# Patient Record
Sex: FEMALE | Race: BLACK OR AFRICAN AMERICAN | NOT HISPANIC OR LATINO | Employment: OTHER | URBAN - METROPOLITAN AREA
[De-identification: names, ages, dates, MRNs, and addresses within clinical notes are randomized per-mention and may not be internally consistent; named-entity substitution may affect disease eponyms.]

---
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
## 2020-02-27 ENCOUNTER — TELEPHONE (OUTPATIENT)
Dept: PEDIATRIC HEMATOLOGY/ONCOLOGY | Facility: CLINIC | Age: 20
End: 2020-02-27

## 2020-02-27 NOTE — TELEPHONE ENCOUNTER
Pt overdue to see Dr Gifford, was due to be seen 10/2019. Called to schedule pt, no answer at mom's # [phone], rang once and went to voicemail, left message for mom to call back to [phone] to schedule. Called # on file for dad, [phone], no answer and no voicemail available to leave a message. Letter sent home for parent to call to schedule.

## 2020-02-27 NOTE — LETTER
February 27, 2020    Daniela Lagos  [address] [address] [facility] [address]             [facility]  [address]  [place] [address]  Phone: [phone]  Fax: [phone] Dear parent of Daniela Lagos:    Daniela was due to follow up with Dr Gifford in October. Please give us a call at [phone] to schedule that appointment at your convenience.       Sincerely,        Yola Shepherd RN

## 2021-03-19 ENCOUNTER — TELEPHONE (OUTPATIENT)
Dept: PEDIATRIC HEMATOLOGY/ONCOLOGY | Facility: CLINIC | Age: 21
End: 2021-03-19

## 2021-03-22 DIAGNOSIS — D69.3 CHRONIC ITP (IDIOPATHIC THROMBOCYTOPENIA): Primary | ICD-10-CM

## 2023-01-13 ENCOUNTER — HOSPITAL ENCOUNTER (EMERGENCY)
Facility: HOSPITAL | Age: 23
Discharge: HOME | End: 2023-01-14
Attending: EMERGENCY MEDICINE
Payer: MEDICAID

## 2023-01-13 DIAGNOSIS — H18.602 KERATOCONUS OF LEFT EYE: Primary | ICD-10-CM

## 2023-01-13 DIAGNOSIS — R62.50 DEVELOPMENTAL DELAY: ICD-10-CM

## 2023-01-13 DIAGNOSIS — I10 HYPERTENSION, UNSPECIFIED TYPE: ICD-10-CM

## 2023-01-13 PROCEDURE — 99284 EMERGENCY DEPT VISIT MOD MDM: CPT | Mod: 25

## 2023-01-13 PROCEDURE — 25000003 PHARM REV CODE 250: Performed by: EMERGENCY MEDICINE

## 2023-01-13 PROCEDURE — 99284 EMERGENCY DEPT VISIT MOD MDM: CPT | Mod: ,,, | Performed by: EMERGENCY MEDICINE

## 2023-01-13 PROCEDURE — 99284 PR EMERGENCY DEPT VISIT,LEVEL IV: ICD-10-PCS | Mod: ,,, | Performed by: EMERGENCY MEDICINE

## 2023-01-13 RX ORDER — PROPARACAINE HYDROCHLORIDE 5 MG/ML
1 SOLUTION/ DROPS OPHTHALMIC
Status: COMPLETED | OUTPATIENT
Start: 2023-01-13 | End: 2023-01-13

## 2023-01-13 RX ORDER — DROPERIDOL 2.5 MG/ML
5 INJECTION, SOLUTION INTRAMUSCULAR; INTRAVENOUS
Status: COMPLETED | OUTPATIENT
Start: 2023-01-14 | End: 2023-01-14

## 2023-01-13 RX ADMIN — PROPARACAINE HYDROCHLORIDE 1 DROP: 5 SOLUTION/ DROPS OPHTHALMIC at 10:01

## 2023-01-13 RX ADMIN — FLUORESCEIN SODIUM 1 EACH: 1 STRIP OPHTHALMIC at 10:01

## 2023-01-14 ENCOUNTER — TELEPHONE (OUTPATIENT)
Dept: EMERGENCY MEDICINE | Facility: HOSPITAL | Age: 23
End: 2023-01-14
Payer: MEDICAID

## 2023-01-14 VITALS
HEIGHT: 56 IN | RESPIRATION RATE: 17 BRPM | HEART RATE: 99 BPM | TEMPERATURE: 98 F | BODY MASS INDEX: 18.44 KG/M2 | OXYGEN SATURATION: 98 % | SYSTOLIC BLOOD PRESSURE: 147 MMHG | WEIGHT: 82 LBS | DIASTOLIC BLOOD PRESSURE: 90 MMHG

## 2023-01-14 PROCEDURE — 63600175 PHARM REV CODE 636 W HCPCS: Performed by: EMERGENCY MEDICINE

## 2023-01-14 PROCEDURE — S0166 INJ OLANZAPINE 2.5MG: HCPCS | Performed by: EMERGENCY MEDICINE

## 2023-01-14 PROCEDURE — 25000003 PHARM REV CODE 250: Performed by: EMERGENCY MEDICINE

## 2023-01-14 PROCEDURE — 96372 THER/PROPH/DIAG INJ SC/IM: CPT | Performed by: EMERGENCY MEDICINE

## 2023-01-14 RX ORDER — MIDAZOLAM HYDROCHLORIDE 5 MG/ML
0.2 INJECTION INTRAMUSCULAR; INTRAVENOUS
Status: COMPLETED | OUTPATIENT
Start: 2023-01-14 | End: 2023-01-14

## 2023-01-14 RX ORDER — DROPERIDOL 2.5 MG/ML
5 INJECTION, SOLUTION INTRAMUSCULAR; INTRAVENOUS
Status: COMPLETED | OUTPATIENT
Start: 2023-01-14 | End: 2023-01-14

## 2023-01-14 RX ORDER — OLANZAPINE 10 MG/2ML
10 INJECTION, POWDER, FOR SOLUTION INTRAMUSCULAR ONCE AS NEEDED
Status: COMPLETED | OUTPATIENT
Start: 2023-01-14 | End: 2023-01-14

## 2023-01-14 RX ORDER — ATROPINE SULFATE 10 MG/ML
1 SOLUTION/ DROPS OPHTHALMIC 2 TIMES DAILY
Qty: 5 ML | Refills: 1 | Status: SHIPPED | OUTPATIENT
Start: 2023-01-14

## 2023-01-14 RX ORDER — SODIUM CHLORIDE 20 MG/ML
1 SOLUTION OPHTHALMIC 4 TIMES DAILY
Qty: 15 ML | Refills: 1 | Status: SHIPPED | OUTPATIENT
Start: 2023-01-14 | End: 2023-02-13

## 2023-01-14 RX ADMIN — DROPERIDOL 5 MG: 2.5 INJECTION, SOLUTION INTRAMUSCULAR; INTRAVENOUS at 12:01

## 2023-01-14 RX ADMIN — MIDAZOLAM HYDROCHLORIDE 3 MG: 5 INJECTION, SOLUTION INTRAMUSCULAR; INTRAVENOUS at 04:01

## 2023-01-14 RX ADMIN — MIDAZOLAM HYDROCHLORIDE 7.5 MG: 5 INJECTION, SOLUTION INTRAMUSCULAR; INTRAVENOUS at 03:01

## 2023-01-14 RX ADMIN — DROPERIDOL 5 MG: 2.5 INJECTION, SOLUTION INTRAMUSCULAR; INTRAVENOUS at 01:01

## 2023-01-14 RX ADMIN — OLANZAPINE 10 MG: 10 INJECTION, POWDER, LYOPHILIZED, FOR SOLUTION INTRAMUSCULAR at 04:01

## 2023-01-14 NOTE — ED NOTES
Pt remains arousable to gentle touch. Respirations E/UL. NAD noted. Pt remains attached to continuous pulse ox and cardiac monitor with mom at bedside. Mom updated on POC and understands optho is coming to help assess and examine pt's eye. Denies any needs at this time

## 2023-01-14 NOTE — ED PROVIDER NOTES
Source of History:  Mother  Chart    Chief complaint:  Eye Drainage (Left eye drainage and crusting for 4 days with swelling. Denies fever. Started erythromycin Wednesday but symptoms continuing.)      HPI:  Daniela Lagos is a 22 y.o. female with history of of mental delay, seizure, autoimmune hypothyroidism, presenting to emergency department with complaint of left eye redness, presumed pain, and drainage.      Patient's mother provide history, as patient is nonverbal at baseline.  Patient's mother states patient follows commands, but is stubborn.   Patient's mother states she 1st noticed redness and tearing from the left eye 2 days ago.  Patient was seen by primary care, prescribed erythromycin eye ointment which mom has been using.  Mom notes the patient has been frequently rubbing the left eye.  There is no notable ocular history in this patient.      No fevers.  She notes some swelling around the eye.  No altered mental status.  Mom also noted that patient's blood pressure was elevated today.    Review of patient's allergies indicates:   Allergen Reactions    Milk containing products Other (See Comments)     bleeding    Tree nut        No current facility-administered medications on file prior to encounter.     Current Outpatient Medications on File Prior to Encounter   Medication Sig Dispense Refill    amoxicillin (AMOXIL) 875 MG tablet Take 875 mg by mouth every 12 (twelve) hours.      clotrimazole (LOTRIMIN) 1 % cream Apply topically 2 (two) times daily. 12 g 1    clotrimazole-betamethasone 1-0.05% (LOTRISONE) cream THONG AA Q OTHER NIGHT AT BEDTIME  1    diazepam 5-7.5-10 mg (DIASTAT ACUDIAL) 5-7.5-10 mg Kit 10 mg in rectum to stop seizure 2 kit 5    ketoconazole (NIZORAL) 2 % shampoo WASH EXT TO THE SCALP QD  2    levothyroxine (SYNTHROID) 150 MCG tablet Take 1 tablet (150 mcg total) by mouth before breakfast. 30 tablet 4    OCEAN NASAL 0.65 % nasal spray SPRAY 1 SPRAY IEN QID FOR 5 DAYS  0       PMH:  As per  HPI and below:  Past Medical History:   Diagnosis Date    Blood transfusion     x13    Chronic ITP (idiopathic thrombocytopenia) 8/7/2015    Febrile seizure 2006    Mental retardation, moderate (I.Q. 35-49)     Premature baby      Past Surgical History:   Procedure Laterality Date    PEG TUBE REMOVAL      UPPER GASTROINTESTINAL ENDOSCOPY         Social History     Socioeconomic History    Marital status: Single   Tobacco Use    Smoking status: Never    Smokeless tobacco: Never   Substance and Sexual Activity    Alcohol use: No    Drug use: No    Sexual activity: Never       Family History   Problem Relation Age of Onset    Hypertension Maternal Grandmother     Thyroid disease Neg Hx     Diabetes Neg Hx        Physical Exam:      Vitals:    01/14/23 0557   BP: (!) 147/90   Pulse: 99   Resp: 17   Temp: 98 °F (36.7 °C)     Gen: Uncomfortable.  Dysmorphic facies, short neck.  Mental Status:  Alert.  Follows commands from her mother.  Nonverbal at baseline.  Skin: Warm, dry. No rashes seen.  Eyes:  Left eye has conjunctival injection, tearing.  She is holding it shut.  Briefly able to glimpse what looks like a corneal ulcer.  Pulm: No increased work of breathing.  No significant tachypnea.  No audible stridor or wheezing.  No conversational dyspnea.    CV: Regular rate. Regular rhythm.   MSK: Good range of motion all joints.  No deformities.    Neuro: Awake. No focal neuro deficit observed.    Laboratory Studies:  Labs Reviewed - No data to display    Chart reviewed.     Imaging Results    None         Medications Given:  Medications   proparacaine 0.5 % ophthalmic solution 1 drop (1 drop Left Eye Given 1/13/23 2214)   fluorescein ophthalmic strip 1 each (1 each Left Eye Given 1/13/23 2214)   droperidoL injection 5 mg (5 mg Intramuscular Given 1/14/23 0032)   droperidoL injection 5 mg (5 mg Intramuscular Given 1/14/23 0153)   midazolam (VERSED) 5 mg/mL injection 7.5 mg (7.5 mg Nasal Given 1/14/23 0306)   midazolam  (VERSED) 5 mg/mL injection 7.5 mg (3 mg Nasal Given 1/14/23 8102)   OLANZapine injection 10 mg (10 mg Intramuscular Given 1/14/23 6780)       Discussed with:  Ophthalmology    MDM:    22 y.o. female with history of developmental delay presenting to emergency department with complaint of painful left red eye.  Patient initially combative during my initial attempt at exam.  Will give dose of droperidol and reassess.  Mother states patient is resistant to anesthesia.     1:27 AM  Re-attempted exam after patient received 1st 5 mg of IM droperidol.  Patient is somnolent but arousable, becomes violent when I attempt to examine her.  Will give additional dose of droperidol.  Will also give intranasal Versed prior to my next exam attempt.    On next attempt, after a total of 10 mg of IM droperidol, and 7.5 mg of intranasal Versed, patient still combative, punching me multiple times while I am attempting to perform the exam.      I consulted Ophthalmology, my concern is for a vision threatening corneal ulcer on the brief glimpse is that I have pad of her I so far.      With Ophthalmology is assistance, and an additional 10 mg of IM olanzapine as well as more intranasal Versed, we were able to perform an exam.  Ophthalmology feels this is consistent with keratoconus of the left eye.  They are recommending the medications that have been prescribed to her.  No indication for Hospital Medicine admission at this time.    Diagnostic Impression:    1. Keratoconus of left eye    2. Hypertension, unspecified type    3. Developmental delay         ED Disposition Condition    Discharge Stable          ED Prescriptions       Medication Sig Dispense Start Date End Date Auth. Provider    atropine 1% (ISOPTO ATROPINE) 1 % Drop Place 1 drop into the left eye 2 (two) times a day. 5 mL 1/14/2023 -- Sapphire Callahan MD    sodium chloride 2% (KAREN 128) 2 % ophthalmic solution Place 1 drop into the left eye 4 (four) times daily. 15 mL  1/14/2023 2/13/2023 Sapphire Callahan MD          Follow-up Information       Follow up With Specialties Details Why Contact Info Additional Information    Maco Diez MD Pediatrics Schedule an appointment as soon as possible for a visit   [address] [facility]  [place] [address] [phone]       [address] Ophthalmology Schedule an appointment as soon as possible for a visit in 1 week  [address] [facility] [address] [phone] Please arrive on the 10th floor for check-in.            Family understands the plan and is in agreement, verbalized understanding, questions answered    Sapphire Callahan MD  Emergency Medicine         Sapphire Callahan MD  01/15/23 0111

## 2023-01-14 NOTE — ED NOTES
Pt arouses very easily. Pt still becoming very agitated, uncooperative, and aggressive with staff while MD attempting to assess pt's left eye

## 2023-01-14 NOTE — ED TRIAGE NOTES
Daniela Lagos, a 22 y.o. female presents to the ED w/ complaint of L eye drainage, redness, and swelling x4 days. Hx provided by mother as Pt is nonverbal at baseline.     Triage note:  Chief Complaint   Patient presents with    Eye Drainage     Left eye drainage and crusting for 4 days with swelling. Denies fever. Started erythromycin Wednesday but symptoms continuing.     Review of patient's allergies indicates:   Allergen Reactions    Milk containing products Other (See Comments)     bleeding    Tree nut      Past Medical History:   Diagnosis Date    Blood transfusion     x13    Chronic ITP (idiopathic thrombocytopenia) 8/7/2015    Febrile seizure 2006    Mental retardation, moderate (I.Q. 35-49)     Premature baby

## 2023-01-14 NOTE — DISCHARGE INSTRUCTIONS
Your blood pressure was elevated in the emergency department.  You must follow-up with your primary care doctor for adjustment of your medicine.    - Start atropine 2x daily left eye  - Start felix (5% saline ointment) 4x daily left eye    Follow-up in ophthalmology clinic in 1 week

## 2023-01-14 NOTE — CONSULTS
Chief complaint/Reason for Consult: Eye swelling/pain     History of Present Illness: Daniela Lagos is a 22 y.o. female with hx of intellectual disability presenting with four days of eye swelling/discomfort with drainage and crusting. Patient brought in by mother. Patient is nonverbal at baseline, but mother has noticed that she has been rubbing her eyes more frequently and noticed some drainage/swelling of pt's eyelids. Saw an outside eye doctor a few days ago, was prescribed erythromycin ointment, which has not helped. No other prior ocular history    Past Ocular Hx: Has glasses but doesn't wear them    Current eye gtts: none      PMHx:  has a past medical history of Blood transfusion, Chronic ITP (idiopathic thrombocytopenia) (8/7/2015), Febrile seizure (2006), Mental retardation, moderate (I.Q. 35-49), and Premature baby.     PSurgHx:  has a past surgical history that includes PEG tube removal and Upper gastrointestinal endoscopy.     Home Medications:   Prior to Admission medications    Medication Sig Start Date End Date Taking? Authorizing Provider   amoxicillin (AMOXIL) 875 MG tablet Take 875 mg by mouth every 12 (twelve) hours.    Historical Provider   clotrimazole (LOTRIMIN) 1 % cream Apply topically 2 (two) times daily. 8/13/15   Angel Gifford MD   clotrimazole-betamethasone 1-0.05% (LOTRISONE) cream THONG AA Q OTHER NIGHT AT BEDTIME 3/8/19   Historical Provider   diazepam 5-7.5-10 mg (DIASTAT ACUDIAL) 5-7.5-10 mg Kit 10 mg in rectum to stop seizure 1/2/15   Segundo Loera II, MD   ketoconazole (NIZORAL) 2 % shampoo WASH EXT TO THE SCALP QD 3/8/19   Historical Provider   levothyroxine (SYNTHROID) 150 MCG tablet Take 1 tablet (150 mcg total) by mouth before breakfast. 2/21/19   Alexa Grajeda MD   OCEAN NASAL 0.65 % nasal spray SPRAY 1 SPRAY IEN QID FOR 5 DAYS 1/3/18   Historical Provider        Medications this encounter:    sodium chloride 0.9%  20 mL/kg Intravenous ED 1 Time       Allergies: is  allergic to milk containing products and tree nut.     Social Hx:  reports that she has never smoked. She has never used smokeless tobacco. She reports that she does not drink alcohol and does not use drugs.     Family Hx: No family history of glaucoma. family history includes Hypertension in her maternal grandmother.     ROS: As per HPI    Ocular examination/Dilated fundus examination:  Base Eye Exam       Visual Acuity    Unable to assess, pt is nonverbal and uncooperative             Tonometry (Palpation, 5:39 AM)         Right Left    Pressure STP STP   Poor pt cooperation - pt was squeezing eyes shut             Pupils         Dark Light Shape React APD    Right 3 2 Round Brisk None    Left                   Visual Fields    Unable to assess - poor pt cooperation             Extraocular Movement    Unable to assess - poor pt cooperation                 Slit Lamp and Fundus Exam       External Exam         Right Left    External Normal Normal              Slit Lamp Exam         Right Left    Lids/Lashes Normal Normal    Conjunctiva/Sclera White and quiet Chemosis    Cornea Clear Diffuse stromal haze with elevation, PEEs without epithelial defect    Anterior Chamber Deep and formed Deep and formed    Iris Round and reactive Round and reactive    Lens Clear Poor view    Anterior Vitreous Normal Poor view    Performed at bedside with indirect + panretinal 2.2              Fundus Exam       Unable to assess due to poor pt cooperation                          Assessment/Plan:   1. Keratoconus with acute hydrops, left eye  - Pt with intellectual disability, poorly cooperative to exam, with a few days of L eye irritation/drainage. She frequently rubs her eyes. On attempted examination, pt became violent and required multiple people to assist in holding patient down.   - On exam, left cornea with diffuse stromal haze and large central elevation without fluorescein staining  - Due to poor pt cooperation and poor view  through stromal haze, unable to assess posteriorly. Will likely require exam under anesthesia for DFE.  - Presentation and history are consistent with keratoconus with acute hydrops    Recommendations  - No eye rubbing, as this can result in perforation of the eye. Recommend taping eye shield in between administration of drops/ointment and especially at night  - Start atropine 2x daily left eye  - Start felix (5% saline ointment) 4x daily left eye  - Follow-up in clinic in 1 week      Discussed patient's history, physical, assessment and plan with Dr. Crow.      Patrick Escoto MD  [employer] Ophthalmology PGY-2

## 2023-01-14 NOTE — ED NOTES
2 RN and MD at bedside. Pt arouses easily to touch. Pt still uncooperative and aggressive with staff while MD attempting to perform physical assessment on left eye

## 2023-01-14 NOTE — TELEPHONE ENCOUNTER
Patient called the ED with questions regarding Sree prescription.  Asking for alternatives for the 5% as it is 40 dollars at [facility].  Appears he received a prescription for 2%.  Upon chart review recommended 5% q.i.d. discussed this with patient's father.  Notified him that he should be using the 5% per ophthalmology note and that it is 15-20 dollars on good Rx at [facility] and [facility].  He verbalizes understanding.  Was given ophthalmology clinic number and in case further questions.

## 2023-01-17 ENCOUNTER — TELEPHONE (OUTPATIENT)
Dept: OPHTHALMOLOGY | Facility: CLINIC | Age: 23
End: 2023-01-17
Payer: MEDICAID

## 2023-01-17 NOTE — TELEPHONE ENCOUNTER
----- Message from Patrick Escoto MD sent at 1/14/2023  5:43 AM CST -----  Regarding: ED Follow-up  Hi,    Can we please schedule this patient for follow-up in about 1 week with cornea? She is intellectually disabled and was seen in the ED with keratoconus with acute hydrops.    Best contact is her mother: [phone]    Thank you,  Patrick

## 2023-01-25 ENCOUNTER — OFFICE VISIT (OUTPATIENT)
Dept: OPHTHALMOLOGY | Facility: CLINIC | Age: 23
End: 2023-01-25
Payer: MEDICAID

## 2023-01-25 DIAGNOSIS — H18.622 ACUTE HYDROPS KERATOCONUS OF LEFT EYE: Primary | ICD-10-CM

## 2023-01-25 PROCEDURE — 92004 COMPRE OPH EXAM NEW PT 1/>: CPT | Mod: S$PBB,,, | Performed by: OPHTHALMOLOGY

## 2023-01-25 PROCEDURE — 1160F PR REVIEW ALL MEDS BY PRESCRIBER/CLIN PHARMACIST DOCUMENTED: ICD-10-PCS | Mod: CPTII,,, | Performed by: OPHTHALMOLOGY

## 2023-01-25 PROCEDURE — 92004 PR EYE EXAM, NEW PATIENT,COMPREHESV: ICD-10-PCS | Mod: S$PBB,,, | Performed by: OPHTHALMOLOGY

## 2023-01-25 PROCEDURE — 1160F RVW MEDS BY RX/DR IN RCRD: CPT | Mod: CPTII,,, | Performed by: OPHTHALMOLOGY

## 2023-01-25 PROCEDURE — 1159F PR MEDICATION LIST DOCUMENTED IN MEDICAL RECORD: ICD-10-PCS | Mod: CPTII,,, | Performed by: OPHTHALMOLOGY

## 2023-01-25 PROCEDURE — 99999 PR PBB SHADOW E&M-EST. PATIENT-LVL III: CPT | Mod: PBBFAC,,, | Performed by: OPHTHALMOLOGY

## 2023-01-25 PROCEDURE — 1159F MED LIST DOCD IN RCRD: CPT | Mod: CPTII,,, | Performed by: OPHTHALMOLOGY

## 2023-01-25 PROCEDURE — 99213 OFFICE O/P EST LOW 20 MIN: CPT | Mod: PBBFAC | Performed by: OPHTHALMOLOGY

## 2023-01-25 PROCEDURE — 99999 PR PBB SHADOW E&M-EST. PATIENT-LVL III: ICD-10-PCS | Mod: PBBFAC,,, | Performed by: OPHTHALMOLOGY

## 2023-01-25 RX ORDER — ERYTHROMYCIN 5 MG/G
OINTMENT OPHTHALMIC
COMMUNITY
Start: 2023-01-11 | End: 2023-05-01

## 2023-01-25 RX ORDER — NEOMYCIN SULFATE, POLYMYXIN B SULFATE, AND DEXAMETHASONE 3.5; 10000; 1 MG/G; [USP'U]/G; MG/G
OINTMENT OPHTHALMIC 3 TIMES DAILY
Qty: 3.5 G | Refills: 4 | Status: SHIPPED | OUTPATIENT
Start: 2023-01-25 | End: 2023-05-01

## 2023-01-25 NOTE — PROGRESS NOTES
HPI    Patient here today for [facility] eval. Patinet acompanied by family member with   c/o eye pain OS, mucus, tearing, and light sensitivity OS x 2 weeks.   Patient family member report eye rubbing and getting soap in the eye.   Denies any previous sx or injury ou. No fmhx of eye disease.    Atropine bid OS  Sree qid ou  Last edited by Lili Meyer on 1/25/2023  8:59 AM.            Assessment /Plan     For exam results, see Encounter Report.    Acute hydrops keratoconus of left eye    Other orders  -     neomycin-polymyxin-dexamethasone (DEXACINE) 3.5 mg/g-10,000 unit/g-0.1 % Oint; Place into the left eye 3 (three) times daily.  Dispense: 3.5 g; Refill: 4      Very difficult exam, but likely hydrops OS  Difficulty with drops so will try kareem  Reassured that condition should resolve on its own in 2-3 mos  NO signs of infection, but very difficult to examine, unable to open eyes etc...  Dev delay, chromosomal

## 2023-03-19 ENCOUNTER — HOSPITAL ENCOUNTER (EMERGENCY)
Facility: HOSPITAL | Age: 23
Discharge: HOME | End: 2023-03-19
Attending: EMERGENCY MEDICINE
Payer: MEDICAID

## 2023-03-19 VITALS
DIASTOLIC BLOOD PRESSURE: 63 MMHG | OXYGEN SATURATION: 98 % | HEART RATE: 105 BPM | TEMPERATURE: 98 F | WEIGHT: 111 LBS | SYSTOLIC BLOOD PRESSURE: 119 MMHG | HEIGHT: 56 IN | BODY MASS INDEX: 24.97 KG/M2 | RESPIRATION RATE: 20 BRPM

## 2023-03-19 DIAGNOSIS — N30.01 ACUTE CYSTITIS WITH HEMATURIA: Primary | ICD-10-CM

## 2023-03-19 LAB
ALBUMIN SERPL BCP-MCNC: 3.5 G/DL (ref 3.5–5.2)
ALP SERPL-CCNC: 42 U/L (ref 55–135)
ALT SERPL W/O P-5'-P-CCNC: 24 U/L (ref 10–44)
ANION GAP SERPL CALC-SCNC: 15 MMOL/L (ref 8–16)
AST SERPL-CCNC: 22 U/L (ref 10–40)
B-HCG UR QL: NEGATIVE
BACTERIA #/AREA URNS HPF: ABNORMAL /HPF
BASOPHILS # BLD AUTO: 0.01 K/UL (ref 0–0.2)
BASOPHILS NFR BLD: 0.1 % (ref 0–1.9)
BILIRUB SERPL-MCNC: 0.7 MG/DL (ref 0.1–1)
BILIRUB UR QL STRIP: NEGATIVE
BUN SERPL-MCNC: 24 MG/DL (ref 6–20)
CALCIUM SERPL-MCNC: 9.1 MG/DL (ref 8.7–10.5)
CHLORIDE SERPL-SCNC: 101 MMOL/L (ref 95–110)
CLARITY UR: ABNORMAL
CO2 SERPL-SCNC: 23 MMOL/L (ref 23–29)
COLOR UR: YELLOW
CREAT SERPL-MCNC: 1.4 MG/DL (ref 0.5–1.4)
CTP QC/QA: YES
DIFFERENTIAL METHOD: ABNORMAL
EOSINOPHIL # BLD AUTO: 0 K/UL (ref 0–0.5)
EOSINOPHIL NFR BLD: 0 % (ref 0–8)
ERYTHROCYTE [DISTWIDTH] IN BLOOD BY AUTOMATED COUNT: 12.2 % (ref 11.5–14.5)
EST. GFR  (NO RACE VARIABLE): 55 ML/MIN/1.73 M^2
GLUCOSE SERPL-MCNC: 105 MG/DL (ref 70–110)
GLUCOSE UR QL STRIP: NEGATIVE
HCT VFR BLD AUTO: 32.9 % (ref 37–48.5)
HGB BLD-MCNC: 10.7 G/DL (ref 12–16)
HGB UR QL STRIP: ABNORMAL
HYALINE CASTS #/AREA URNS LPF: 0 /LPF
IMM GRANULOCYTES # BLD AUTO: 0.04 K/UL (ref 0–0.04)
IMM GRANULOCYTES NFR BLD AUTO: 0.4 % (ref 0–0.5)
KETONES UR QL STRIP: ABNORMAL
LACTATE SERPL-SCNC: 1.2 MMOL/L (ref 0.5–2.2)
LEUKOCYTE ESTERASE UR QL STRIP: ABNORMAL
LIPASE SERPL-CCNC: 51 U/L (ref 4–60)
LYMPHOCYTES # BLD AUTO: 0.4 K/UL (ref 1–4.8)
LYMPHOCYTES NFR BLD: 3.6 % (ref 18–48)
MCH RBC QN AUTO: 31.5 PG (ref 27–31)
MCHC RBC AUTO-ENTMCNC: 32.5 G/DL (ref 32–36)
MCV RBC AUTO: 97 FL (ref 82–98)
MICROSCOPIC COMMENT: ABNORMAL
MONOCYTES # BLD AUTO: 0.5 K/UL (ref 0.3–1)
MONOCYTES NFR BLD: 4.5 % (ref 4–15)
NEUTROPHILS # BLD AUTO: 9.1 K/UL (ref 1.8–7.7)
NEUTROPHILS NFR BLD: 91.4 % (ref 38–73)
NITRITE UR QL STRIP: POSITIVE
NRBC BLD-RTO: 0 /100 WBC
PH UR STRIP: 6 [PH] (ref 5–8)
PLATELET # BLD AUTO: 193 K/UL (ref 150–450)
PMV BLD AUTO: 9.4 FL (ref 9.2–12.9)
POTASSIUM SERPL-SCNC: 3.7 MMOL/L (ref 3.5–5.1)
PROT SERPL-MCNC: 8 G/DL (ref 6–8.4)
PROT UR QL STRIP: ABNORMAL
RBC # BLD AUTO: 3.4 M/UL (ref 4–5.4)
RBC #/AREA URNS HPF: 9 /HPF (ref 0–4)
SODIUM SERPL-SCNC: 139 MMOL/L (ref 136–145)
SP GR UR STRIP: 1.01 (ref 1–1.03)
SQUAMOUS #/AREA URNS HPF: 6 /HPF
URN SPEC COLLECT METH UR: ABNORMAL
UROBILINOGEN UR STRIP-ACNC: ABNORMAL EU/DL
WBC # BLD AUTO: 9.99 K/UL (ref 3.9–12.7)
WBC #/AREA URNS HPF: >100 /HPF (ref 0–5)
WBC CLUMPS URNS QL MICRO: ABNORMAL

## 2023-03-19 PROCEDURE — 87186 SC STD MICRODIL/AGAR DIL: CPT | Performed by: NURSE PRACTITIONER

## 2023-03-19 PROCEDURE — 81000 URINALYSIS NONAUTO W/SCOPE: CPT | Performed by: NURSE PRACTITIONER

## 2023-03-19 PROCEDURE — 81025 URINE PREGNANCY TEST: CPT | Performed by: NURSE PRACTITIONER

## 2023-03-19 PROCEDURE — 83605 ASSAY OF LACTIC ACID: CPT | Performed by: EMERGENCY MEDICINE

## 2023-03-19 PROCEDURE — 87088 URINE BACTERIA CULTURE: CPT | Performed by: NURSE PRACTITIONER

## 2023-03-19 PROCEDURE — 99283 EMERGENCY DEPT VISIT LOW MDM: CPT

## 2023-03-19 PROCEDURE — 83690 ASSAY OF LIPASE: CPT | Performed by: EMERGENCY MEDICINE

## 2023-03-19 PROCEDURE — 85025 COMPLETE CBC W/AUTO DIFF WBC: CPT | Performed by: EMERGENCY MEDICINE

## 2023-03-19 PROCEDURE — 80053 COMPREHEN METABOLIC PANEL: CPT | Performed by: EMERGENCY MEDICINE

## 2023-03-19 PROCEDURE — 25000003 PHARM REV CODE 250: Performed by: EMERGENCY MEDICINE

## 2023-03-19 PROCEDURE — 87077 CULTURE AEROBIC IDENTIFY: CPT | Performed by: NURSE PRACTITIONER

## 2023-03-19 PROCEDURE — 87086 URINE CULTURE/COLONY COUNT: CPT | Performed by: NURSE PRACTITIONER

## 2023-03-19 RX ORDER — CEPHALEXIN 500 MG/1
500 CAPSULE ORAL
Status: COMPLETED | OUTPATIENT
Start: 2023-03-19 | End: 2023-03-19

## 2023-03-19 RX ORDER — CEPHALEXIN 500 MG/1
500 CAPSULE ORAL 4 TIMES DAILY
Qty: 20 CAPSULE | Refills: 0 | Status: SHIPPED | OUTPATIENT
Start: 2023-03-19 | End: 2023-03-24

## 2023-03-19 RX ADMIN — CEPHALEXIN 500 MG: 500 CAPSULE ORAL at 02:03

## 2023-03-19 NOTE — DISCHARGE INSTRUCTIONS
You were evaluated in the Emergency Department for your symptoms. We evaluated you here today on emergency basis only, and it is important that you follow-up with your primary care provider to review your test results, as well as your symptoms for non-emergent, causes as well as any incidental findings today during your evaluation.        We performed a medical exam, in addition to blood labs, urine studies that did not show any concerning signs that need additional testing or need for you to stay in the hospital at this time. You have a urine infection that causes your symptoms.     Please follow-up with your primary care doctor in the next week to make sure  someone is following your symptoms.     You can take Tylenol 1 gram every 8 hours, and ibuprofen 800 mg every 8 hours; this means you can take pain medicine once every 4 hours.     We are starting you on a new medication:  Take keflex for five days. If this is an antibiotic, finish all of the medication, even if you start feeling better.     Return to the Emergency Department or contact your primary care doctor  immediately if you have new or concerning symptoms, pain that is not controlled  by over the counter medications, or you have any other concerns.     It has been a pleasure taking care of you.     Future Appointments   Date Time Provider Department Center   3/21/2023  3:00 PM Steven Rowland MD [facility] [place]

## 2023-03-19 NOTE — ED PROVIDER NOTES
Emergency Department Encounter  Provider Note  Encounter Date: 3/19/2023    Patient Name: Daniela Lagos  MRN: [id number]    History of Present Illness   HPI  History of Present Illness:    Chief Complaint:   Chief Complaint   Patient presents with    Vomiting     Urinary incontinence, vomiting (3) episodes for 3 days. Pt. Had rigors today and was given Motrin 400mg approx. 1hr ago.        22-year-old female with a history of developmental delay presenting with urinary incontinence, shaking chills without measured fevers, and vomiting for the past couple of days.  Mom states that there is a smell to her urine.  No diarrhea, cough.  Patient was patting her belly today.  Otherwise mentally acting normally.    The following PMH/PSH/SocHx/FamHx has been reviewed by myself:    Past Medical History:   Diagnosis Date    Blood transfusion     x13    Chronic ITP (idiopathic thrombocytopenia) 8/7/2015    Febrile seizure 2006    Mental retardation, moderate (I.Q. 35-49)     Premature baby      Past Surgical History:   Procedure Laterality Date    PEG TUBE REMOVAL      UPPER GASTROINTESTINAL ENDOSCOPY       Social History     Tobacco Use    Smoking status: Never    Smokeless tobacco: Never   Substance Use Topics    Alcohol use: No    Drug use: No     Family History   Problem Relation Age of Onset    Hypertension Maternal Grandmother     Thyroid disease Neg Hx     Diabetes Neg Hx     Amblyopia Neg Hx     Blindness Neg Hx     Cataracts Neg Hx     Glaucoma Neg Hx     Macular degeneration Neg Hx     Retinal detachment Neg Hx     Strabismus Neg Hx        Allergies reviewed:  Review of patient's allergies indicates:   Allergen Reactions    Nut - unspecified Shortness Of Breath    Milk containing products Other (See Comments)     bleeding    Tree nut        Medications reviewed:  Medication List with Changes/Refills   New Medications    CEPHALEXIN (KEFLEX) 500 MG CAPSULE    Take 1 capsule (500 mg total) by mouth 4 (four) times daily. for 5  days   Current Medications    ATROPINE 1% (ISOPTO ATROPINE) 1 % DROP    Place 1 drop into the left eye 2 (two) times a day.    CLOTRIMAZOLE (LOTRIMIN) 1 % CREAM    Apply topically 2 (two) times daily.    CLOTRIMAZOLE-BETAMETHASONE 1-0.05% (LOTRISONE) CREAM    THONG AA Q OTHER NIGHT AT BEDTIME    DIAZEPAM 5-7.5-10 MG (DIASTAT ACUDIAL) 5-7.5-10 MG KIT    10 mg in rectum to stop seizure    ERYTHROMYCIN (ROMYCIN) OPHTHALMIC OINTMENT    Place into both eyes.    KETOCONAZOLE (NIZORAL) 2 % SHAMPOO    WASH EXT TO THE SCALP QD    LEVOTHYROXINE (SYNTHROID) 150 MCG TABLET    Take 1 tablet (150 mcg total) by mouth before breakfast.    NEOMYCIN-POLYMYXIN-DEXAMETHASONE (DEXACINE) 3.5 MG/G-10,000 UNIT/G-0.1 % OINT    Place into the left eye 3 (three) times daily.    OCEAN NASAL 0.65 % NASAL SPRAY    SPRAY 1 SPRAY IEN QID FOR 5 DAYS    SODIUM CHLORIDE 2% (KAREN 128) 2 % OPHTHALMIC SOLUTION    Place 1 drop into the left eye 4 (four) times daily.   Discontinued Medications    AMOXICILLIN (AMOXIL) 875 MG TABLET    Take 875 mg by mouth every 12 (twelve) hours.       ROS  Review of Systems:    Constitutional:  Negative for fever.   HENT:  Negative for sore throat.    Eyes:  Negative for redness.   Respiratory:  Negative for shortness of breath.    Cardiovascular:  Negative for chest pain.   Gastrointestinal:  Negative for nausea.   Genitourinary:  Negative for dysuria.   Musculoskeletal:  Negative for back pain.   Skin:  Negative for rash.   Neurological:  Negative for weakness.   Hematological:  Does not bruise/bleed easily.   Psychiatric/Behavioral:  The patient is not nervous/anxious.      Physical Exam   Physical Exam    Initial Vitals [03/19/23 1322]   BP Pulse Resp Temp SpO2   119/63 105 20 98 °F (36.7 °C) 98 %      MAP       --           Triage vital signs reviewed.    Constitutional:  Abnormal facies.  At baseline per mom.  HENT: Normocephalic, atraumatic. Moist mucous membranes.  Eyes: No conjunctival injection.  Resp: Normal  respiratory effort, breathing unlabored.  Cardio: Regular rate and rhythm.  GI: Abdomen non-distended.  No pain with palpation.  MSK: Extremities without any deformities noted. No lower extremity edema.  Skin: Warm and dry. No rashes or lesions noted.  Neuro: Awake and alert. Moves all extremities.    ED Course   Procedures    Medical Decision Making    History Acquisition     The history is provided by the patient's mother.   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  .    EKG   EKG ordered and independently reviewed by me:       Labs   Lab tests ordered and independently reviewed by me:    Labs Reviewed   URINALYSIS, REFLEX TO URINE CULTURE - Abnormal; Notable for the following components:       Result Value    Appearance, UA Hazy (*)     Protein, UA 2+ (*)     Ketones, UA 1+ (*)     Occult Blood UA 1+ (*)     Nitrite, UA Positive (*)     Urobilinogen, UA 2.0-3.0 (*)     Leukocytes, UA 3+ (*)     All other components within normal limits    Narrative:     Specimen Source->Urine   CBC W/ AUTO DIFFERENTIAL - Abnormal; Notable for the following components:    RBC 3.40 (*)     Hemoglobin 10.7 (*)     Hematocrit 32.9 (*)     MCH 31.5 (*)     Gran # (ANC) 9.1 (*)     Lymph # 0.4 (*)     Gran % 91.4 (*)     Lymph % 3.6 (*)     All other components within normal limits   COMPREHENSIVE METABOLIC PANEL - Abnormal; Notable for the following components:    BUN 24 (*)     Alkaline Phosphatase 42 (*)     eGFR 55 (*)     All other  components within normal limits   URINALYSIS MICROSCOPIC - Abnormal; Notable for the following components:    RBC, UA 9 (*)     WBC, UA >100 (*)     WBC Clumps, UA Many (*)     Bacteria Many (*)     All other components within normal limits    Narrative:     Specimen Source->Urine   POCT URINE PREGNANCY - Abnormal; Notable for the following components:    POC Preg Test, Ur Negative (*)     All other components within normal limits   CULTURE, URINE   LACTIC ACID, PLASMA   LIPASE         Imaging   Imaging ordered and independently reviewed by me:   Imaging Results    None              Additional Consideration   Daniela Lagos  presents to the emergency Department today with shaking chills without measured fevers, abdominal pain, foul-smelling urine.  Will obtain labs, urinalysis.    Patient's labs are unremarkable; she does have urine infection.  No leukocytosis, no ADRIAN.  Patient is well-appearing.  Will treat for urine infection and discharge.    Additional testing considered but not indicated during the course of this workup: further imaging and labwork, not indicated  Co-morbidities/chronic illness/exacerbation of chronic illness considered which impacted clinical decision making:  Developmental delay  Procedures done in the ED or plan for the OR: No  Social determinants of care considered during development of treatment plan include: Decreased medical literacy  Discussion of management or test interpretation with external provider: No  DNR discussion: No    The patient's list of active medications and allergies as documented per RN staff has been reviewed.  Medications given in the ED and/or prescribed:   Medications   cephALEXin capsule 500 mg (500 mg Oral Given 3/19/23 1448)             ED Course as of 03/19/23 1456   Sun Mar 19, 2023   1454 Lipase [CS]   1454 Comprehensive metabolic panel(!)  Independently interpreted by me, unremarkable    [CS]   1454 POCT urine pregnancy(!) [CS]   1454 Urinalysis  Microscopic(!)  Independently interpreted by me, nitrite positive, leuk positive, WBCs, evidence for urine infection   [CS]   1455 CBC auto differential(!) [CS]   1455 Urinalysis, Reflex to Urine Culture Urine, Clean Catch(!)  Abnormal [CS]      ED Course User Index  [CS] Ewelina Rosenberg MD       Explanation of disposition: Discharge     Clinical Impression:     1. Acute cystitis with hematuria         All results from the workup were reviewed with the patient/family in detail. I discussed with the patient and/or the family/caregiver that today's evaluation in the ED does not suggest any emergent or life-threatening medical conditions that would require hospitalization or immediate intervention beyond what was provided in the ED. I believe the patient is safe for discharge.  However, a reassuring evaluation in the ED does not preclude the presence or development of a serious or life-threatening condition. As such, strict return precautions were discussed with the patient expressing understanding of instructions, and all questions answered. The patient/family communicates understanding of all this information and all remaining questions and concerns were addressed at this time.    The patient/family has been provided with verbal and printed direction regarding our final diagnosis(es) as well as instructions regarding use of OTC and/or Rx medications intended to manage the patient's aforementioned conditions including:  ED Prescriptions       Medication Sig Dispense Start Date End Date Auth. Provider    cephALEXin (KEFLEX) 500 MG capsule Take 1 capsule (500 mg total) by mouth 4 (four) times daily. for 5 days 20 capsule 3/19/2023 3/24/2023 Ewelina Rosenberg MD          The patient's condition does not warrant review of the  and prescription of controlled substances.      ED Disposition Condition    Discharge Stable               Ewelina Rosenberg MD  03/19/23 9735

## 2023-03-21 ENCOUNTER — OFFICE VISIT (OUTPATIENT)
Dept: OPHTHALMOLOGY | Facility: CLINIC | Age: 23
End: 2023-03-21
Payer: MEDICAID

## 2023-03-21 DIAGNOSIS — H18.623 KERATOCONUS, UNSTABLE, BILATERAL: ICD-10-CM

## 2023-03-21 DIAGNOSIS — H18.622 ACUTE HYDROPS KERATOCONUS OF LEFT EYE: Primary | ICD-10-CM

## 2023-03-21 PROCEDURE — 92014 COMPRE OPH EXAM EST PT 1/>: CPT | Mod: S$PBB,,, | Performed by: OPHTHALMOLOGY

## 2023-03-21 PROCEDURE — 92014 PR EYE EXAM, EST PATIENT,COMPREHESV: ICD-10-PCS | Mod: S$PBB,,, | Performed by: OPHTHALMOLOGY

## 2023-03-21 PROCEDURE — 1159F MED LIST DOCD IN RCRD: CPT | Mod: CPTII,,, | Performed by: OPHTHALMOLOGY

## 2023-03-21 PROCEDURE — 1160F RVW MEDS BY RX/DR IN RCRD: CPT | Mod: CPTII,,, | Performed by: OPHTHALMOLOGY

## 2023-03-21 PROCEDURE — 99999 PR PBB SHADOW E&M-EST. PATIENT-LVL II: CPT | Mod: PBBFAC,,, | Performed by: OPHTHALMOLOGY

## 2023-03-21 PROCEDURE — 1160F PR REVIEW ALL MEDS BY PRESCRIBER/CLIN PHARMACIST DOCUMENTED: ICD-10-PCS | Mod: CPTII,,, | Performed by: OPHTHALMOLOGY

## 2023-03-21 PROCEDURE — 99212 OFFICE O/P EST SF 10 MIN: CPT | Mod: PBBFAC | Performed by: OPHTHALMOLOGY

## 2023-03-21 PROCEDURE — 1159F PR MEDICATION LIST DOCUMENTED IN MEDICAL RECORD: ICD-10-PCS | Mod: CPTII,,, | Performed by: OPHTHALMOLOGY

## 2023-03-21 PROCEDURE — 99999 PR PBB SHADOW E&M-EST. PATIENT-LVL II: ICD-10-PCS | Mod: PBBFAC,,, | Performed by: OPHTHALMOLOGY

## 2023-03-21 NOTE — PROGRESS NOTES
HPI    Pt here for Keratoconus follow up  Pt state no complaints at this time   Pt state OS looks better than last Visit   Last edited by Hernan Guy on 3/21/2023  3:22 PM.            Assessment /Plan     For exam results, see Encounter Report.    Acute hydrops keratoconus of left eye    Keratoconus, unstable, bilateral      Hydrops resolving but KCN severe OU  Developmental challenges preclude use of CTLs etc...

## 2023-03-22 LAB — BACTERIA UR CULT: ABNORMAL

## 2023-04-26 ENCOUNTER — TELEPHONE (OUTPATIENT)
Dept: OPHTHALMOLOGY | Facility: CLINIC | Age: 23
End: 2023-04-26
Payer: MEDICAID

## 2023-04-26 NOTE — TELEPHONE ENCOUNTER
----- Message from Gregory Brown sent at 4/26/2023 12:40 PM CDT -----  Contact: [phone]  Pt is calling because her daughters eye has turned blue and she said that it looks like it is coming out a little bit. She states that her daughter a a similar situation with her other eye before. Please call back to further assist.

## 2023-05-01 ENCOUNTER — OFFICE VISIT (OUTPATIENT)
Dept: OPTOMETRY | Facility: CLINIC | Age: 23
End: 2023-05-01
Payer: MEDICAID

## 2023-05-01 DIAGNOSIS — H18.623: Primary | ICD-10-CM

## 2023-05-01 PROCEDURE — 1159F MED LIST DOCD IN RCRD: CPT | Mod: CPTII,,, | Performed by: OPTOMETRIST

## 2023-05-01 PROCEDURE — 99999 PR PBB SHADOW E&M-EST. PATIENT-LVL I: CPT | Mod: PBBFAC,,, | Performed by: OPTOMETRIST

## 2023-05-01 PROCEDURE — 99204 OFFICE O/P NEW MOD 45 MIN: CPT | Mod: S$PBB,,, | Performed by: OPTOMETRIST

## 2023-05-01 PROCEDURE — 99999 PR PBB SHADOW E&M-EST. PATIENT-LVL I: ICD-10-PCS | Mod: PBBFAC,,, | Performed by: OPTOMETRIST

## 2023-05-01 PROCEDURE — 99204 PR OFFICE/OUTPT VISIT, NEW, LEVL IV, 45-59 MIN: ICD-10-PCS | Mod: S$PBB,,, | Performed by: OPTOMETRIST

## 2023-05-01 PROCEDURE — 1159F PR MEDICATION LIST DOCUMENTED IN MEDICAL RECORD: ICD-10-PCS | Mod: CPTII,,, | Performed by: OPTOMETRIST

## 2023-05-01 PROCEDURE — 99211 OFF/OP EST MAY X REQ PHY/QHP: CPT | Mod: PBBFAC,PN | Performed by: OPTOMETRIST

## 2023-05-01 RX ORDER — NEOMYCIN SULFATE, POLYMYXIN B SULFATE, AND DEXAMETHASONE 3.5; 10000; 1 MG/G; [USP'U]/G; MG/G
OINTMENT OPHTHALMIC 3 TIMES DAILY
Qty: 3.5 G | Refills: 4 | Status: SHIPPED | OUTPATIENT
Start: 2023-05-01

## 2023-05-01 NOTE — LETTER
May 1, 2023      [facility]  [address] [place] [address]  [place] [address]  Phone: [phone]  Fax: [phone]       Patient: Daniela Lagos   YOB: 2000  Date of Visit: 05/01/2023    To Whom It May Concern:    Pepe Lagos  was at [facility] on 05/01/2023 and was accompanied by her mother Libertad Rinaldi whom may return to work/school on 05/02/2023 with no restrictions. If you have any questions or concerns, or if I can be of further assistance, please do not hesitate to contact me.    Sincerely,    Mary Quintero OD.

## 2023-05-02 NOTE — PROGRESS NOTES
HPI    Pt is brought here today by her mother due to ocular concerns due to   discoloration OD.  Mom reports about 1 week ago her right eye became blue in color and   excessive discharge. This happened to the left eye recently and they saw   Dr. Rowland and was given ointment to help clear it up. The right eye was   fine at the time. Mom is very concerned.  Last edited by Felton Alvares on 5/1/2023  2:43 PM.            Assessment /Plan     For exam results, see Encounter Report.    Acute hydrops keratoconus of both eyes    Other orders  -     neomycin-polymyxin-dexamethasone (MAXITROL) 3.5 mg/g-10,000 unit/g-0.1 % Oint; Place into both eyes 3 (three) times daily.  Dispense: 3.5 g; Refill: 4      MONITOR. ED PT ON ALL EXAM FINDINGS  H/O KERATOCONUS (SEVERE) OU; K HYDROPS AT VISIT; MILD INJECTION OU  RX MAXITROL SHANTAL OU TID OU X 1 WEEK  REFER TO [facility] FOR CONSULT   RTC 1 YR//PRN FOR REE/DFE

## 2023-05-05 ENCOUNTER — TELEPHONE (OUTPATIENT)
Dept: OPHTHALMOLOGY | Facility: CLINIC | Age: 23
End: 2023-05-05
Payer: MEDICAID

## 2023-05-05 NOTE — TELEPHONE ENCOUNTER
Appointment has been rescheduled for Dr Rowland. I have made several attempts to reach pt. Unable to leave message

## 2023-05-05 NOTE — TELEPHONE ENCOUNTER
----- Message from Francisca Valenzuela MD sent at 5/5/2023 10:30 AM CDT -----  Antonette Dyer this is a [facility] pt -[facility] reschedule appt with him.

## 2023-05-17 ENCOUNTER — TELEPHONE (OUTPATIENT)
Dept: OPHTHALMOLOGY | Facility: CLINIC | Age: 23
End: 2023-05-17
Payer: MEDICAID

## 2024-02-29 NOTE — TELEPHONE ENCOUNTER
Have not heard back from parent re rescheduling appt with Dr Gifford from 2/7 to beginning of March. Called again at this time, no answer, left message for parent to call back to [phone] to reschedule appt.   
No assistance needed

## 2025-03-17 ENCOUNTER — HOSPITAL ENCOUNTER (EMERGENCY)
Facility: HOSPITAL | Age: 25
Discharge: HOME | End: 2025-03-17
Attending: STUDENT IN AN ORGANIZED HEALTH CARE EDUCATION/TRAINING PROGRAM
Payer: MEDICAID

## 2025-03-17 VITALS
BODY MASS INDEX: 23.76 KG/M2 | WEIGHT: 106 LBS | HEART RATE: 110 BPM | RESPIRATION RATE: 18 BRPM | OXYGEN SATURATION: 99 %
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
## 2025-03-17 DIAGNOSIS — S82.61XA CLOSED FRACTURE OF DISTAL LATERAL MALLEOLUS OF RIGHT FIBULA, INITIAL ENCOUNTER: Primary | ICD-10-CM

## 2025-03-17 PROCEDURE — 99283 EMERGENCY DEPT VISIT LOW MDM: CPT | Mod: 25

## 2025-03-17 PROCEDURE — 29515 APPLICATION SHORT LEG SPLINT: CPT | Mod: RT

## 2025-03-17 PROCEDURE — 25000003 PHARM REV CODE 250: Performed by: STUDENT IN AN ORGANIZED HEALTH CARE EDUCATION/TRAINING PROGRAM

## 2025-03-17 RX ORDER — TRIPROLIDINE/PSEUDOEPHEDRINE 2.5MG-60MG
5 TABLET ORAL
Status: DISCONTINUED | OUTPATIENT
Start: 2025-03-17 | End: 2025-03-17

## 2025-03-17 RX ORDER — TRIPROLIDINE/PSEUDOEPHEDRINE 2.5MG-60MG
500 TABLET ORAL ONCE
Status: COMPLETED | OUTPATIENT
Start: 2025-03-17 | End: 2025-03-17

## 2025-03-17 RX ADMIN — IBUPROFEN 500 MG: 100 SUSPENSION ORAL at 10:03

## 2025-03-18 ENCOUNTER — TELEPHONE (OUTPATIENT)
Dept: ORTHOPEDICS | Facility: CLINIC | Age: 25
End: 2025-03-18
Payer: MEDICAID

## 2025-03-18 NOTE — PROGRESS NOTES
Orthopedic Trauma Surgery History and Physical Exam    Patient Problem(s):  Right distal fibula avulsion fracture  Date of Injury:  03/17/2025.  Mechanism of Injury:  Fall getting out about the bathtub    HPI: Patient is a 24-year-old female who had a fall getting out of the bathtub.  She has a history of chromosomal abnormality, intellectual disability, nonverbal and is brought in by her mother.  After the fall she has been refusing to bear weight. Mom reports that she started to try to bear weight yesterday, but still complains of pain.    PMH:  Past Medical History:   Diagnosis Date    Blood transfusion     x13    Chronic ITP (idiopathic thrombocytopenia) 8/7/2015    Febrile seizure 2006    Mental retardation, moderate (I.Q. 35-49)     Premature baby        PSH:  Past Surgical History:   Procedure Laterality Date    PEG TUBE REMOVAL      UPPER GASTROINTESTINAL ENDOSCOPY         Imaging: I have independently reviewed and interpreted this patient's imaging.  X-rays demonstrate a small avulsion fracture off of the distal fibula.    PE:  Tenderness over the lateral malleolus.  Chronic foot deformity.    Assessment and Plan:  Daniela has a stable ankle injury.  Typically I would recommend weight-bearing as tolerated for 4 weeks in a Cam boot.  However given her foot deformity and intellectual disability I think that the Cam boot may cause more problems than it is worth.  They may also consider a lace-up ankle brace or neoprene sleeve if those would be easier for her. Mom thinks a lace up ankle brace will work.  I will see her back in 3 weeks to see how she is progressing.

## 2025-03-18 NOTE — DISCHARGE INSTRUCTIONS
Follow-up with podiatrist or orthopedist by calling for appointment.  Use Tylenol and Motrin for pain.  Whenever resting, keep leg elevated propped up on pillows, and keep ice pack on it for the next few days.  Keep this area dry, and do not let patient bear any weight on it until it is okayed by orthopedist or podiatrist.  Return to the emergency department if condition worsens in any way.

## 2025-03-18 NOTE — ED NOTES
Patient mother and grandmother reports patient fell while getting out of tub. It is unknown if patient hit her head. Patient unable to answer questions due to being non-verbal. Per patient's mother patient would point to right knee and right ankle symbolizing pain in those areas. MD Jacy at bedside placing cast on patient at this time. Patient appears to be in no acute distress at this time.

## 2025-03-18 NOTE — ED PROVIDER NOTES
Encounter Date: 3/17/2025       History     Chief Complaint   Patient presents with    Fall     Hx Chromosomal abnormality, nonverbal. Pt was found on bathroom floor. Pt c/o RLE pain and forehead pain. Unknown hit head or LOC, -blood thinner.      HPI  24-year-old female with a history of chromosomal abnormality, moderate/severe intellectual disability, nonverbal, presents brought in by mother and grandmother through triage after fall, patient had mechanical slip and fall attempting to get out of the bath tub, patient winces in pain with the attempts to mobilize the right leg and has been refusing to bear weight.  Review of patient's allergies indicates:   Allergen Reactions    Nut - unspecified Shortness Of Breath    Milk containing products (dairy) Other (See Comments)     bleeding    Tree nut      Past Medical History:   Diagnosis Date    Blood transfusion     x13    Chronic ITP (idiopathic thrombocytopenia) 8/7/2015    Febrile seizure 2006    Mental retardation, moderate (I.Q. 35-49)     Premature baby      Past Surgical History:   Procedure Laterality Date    PEG TUBE REMOVAL      UPPER GASTROINTESTINAL ENDOSCOPY       Family History   Problem Relation Name Age of Onset    Hypertension Maternal Grandmother      Thyroid disease Neg Hx      Diabetes Neg Hx      Amblyopia Neg Hx      Blindness Neg Hx      Cataracts Neg Hx      Glaucoma Neg Hx      Macular degeneration Neg Hx      Retinal detachment Neg Hx      Strabismus Neg Hx       Social History[1]  Medical surgical family and social history reviewed  Review of Systems  Complete review of systems was conducted and was negative except as per HPI and as marked  Physical Exam     Initial Vitals [03/17/25 1826]   BP Pulse Resp Temp SpO2   -- 98 20 -- 98 %      MAP       --         Physical Exam  Physical:  General: Awake, alert, no acute distress. syndromic findings (apply to all)  Head: Normocephalic, atraumatic  Neck: Trachea midline, full range of motion, no  meningismus  ENT: Atraumatic, Airway Patent  Cardio: Regular Rate, Regular Rhythm, skin perfusion normal  Chest: Atraumatic, No respiratory distress no use of accessory muscles to breath, normal rate  Upper Ext: Atraumatic, Inspection normal, no swelling  Lower Ext:  Some degree of lateral deviation of the foot, symmetric and equal to left side, however right side with swelling over the lateral malleolus and tender in that area.  Limb is warm and well perfused.  No open lesions.  No compartment syndrome.    Neuro: No gross cranial nerve abnormality, no lateralization, no gross sensory or motor deficits  ED Course   Procedures  Labs Reviewed   POCT URINE PREGNANCY          Imaging Results              X-Ray Knee 1 or 2 View Right (Final result)  Result time 03/17/25 21:56:16   Procedure changed from X-Ray Knee 3 View Right     Final result by Sandy Mata MD (03/17/25 21:56:16)                   Impression:      No acute abnormality.      Electronically signed by: Sandy Mata  Date:    03/17/2025  Time:    21:56               Narrative:    EXAMINATION:  XR KNEE 1 OR 2 VIEW RIGHT    CLINICAL HISTORY:  fall;  Unspecified fall, initial encounter    TECHNIQUE:  AP and lateral views of the right knee were performed.    COMPARISON:  None    FINDINGS:  The there is no evidence of a knee effusion.  There is no acute fracture or dislocation.  Knee joint space is well maintained.  Soft tissues are unremarkable.                                       X-Ray Ankle 2 View Right (Final result)  Result time 03/17/25 21:55:31   Procedure changed from X-Ray Ankle Complete Right     Final result by Sandy Mata MD (03/17/25 21:55:31)                   Impression:      Lateral malleolar acute  fracture.      Electronically signed by: Sandy Mata  Date:    03/17/2025  Time:    21:55               Narrative:    EXAMINATION:  XR ANKLE 2 VIEW RIGHT    CLINICAL HISTORY:  fall;  Unspecified fall, initial  encounter    TECHNIQUE:  Two views of the right ankle were obtained    COMPARISON:  None    FINDINGS:  There is a transverse fractures through the distal tip of the distal fibula below the plafond and.  There is overlying soft tissue swelling.  Positioning limits evaluation for the ankle mortise.  No other appreciable acute fractures peer                                       X-Ray Hip 2 or 3 views Right with Pelvis when performed (Final result)  Result time 03/17/25 21:10:11      Final result by Sandy Mata MD (03/17/25 21:10:11)                   Impression:      No acute abnormality involving the right hip or pelvis is imaged.      Electronically signed by: Sandy Mata  Date:    03/17/2025  Time:    21:10               Narrative:    EXAMINATION:  XR HIP WITH PELVIS WHEN PERFORMED 2 OR 3 VIEWS RIGHT    CLINICAL HISTORY:  Fall;    TECHNIQUE:  AP view of the pelvis and frog leg lateral view of the right hip were performed.    COMPARISON:  None    FINDINGS:  There is no acute fracture or dislocation involving the right hip.  Joint space is well maintained and symmetric compared to the left.  SI joints and pubic symphysis are intact.  Osseous structures of the pelvis are intact.                                       CT Head Without Contrast (Final result)  Result time 03/17/25 20:16:22      Final result by Sandy Mata MD (03/17/25 20:16:22)                   Impression:      No acute intracranial abnormality or fracture as imaged noting limited exam secondary to motion.      Electronically signed by: Sandy Mata  Date:    03/17/2025  Time:    20:16               Narrative:    EXAMINATION:  CT HEAD WITHOUT CONTRAST    CLINICAL HISTORY:  Facial trauma, blunt;    TECHNIQUE:  Low dose axial images were obtained through the head.  Coronal and sagittal reformations were also performed. Contrast was not administered.    COMPARISON:  12/21/2014 CT brain    FINDINGS:  Motion artifact is present  therefore 2 series were obtained.  There is no appreciable acute intra or extra-axial hemorrhage or hematoma.  The gray-white matter junction differentiation is intact.  Ventricles, cisterns and sulci are intact for age.  There is no mass effect/midline shift.  As imaged the sinuses mastoid air cells and middle ears essentially appear clear allowing for artifact.  Bony calvarium grossly appears intact.                                       Medications   ibuprofen 20 mg/mL oral liquid 500 mg (has no administration in time range)     Medical Decision Making     24-year-old female with a history of chromosomal abnormality, moderate/severe intellectual disability, nonverbal, presents brought in by mother and grandmother through triage after fall, patient had mechanical slip and fall attempting to get out of the bath tub, patient winces in pain with the attempts to mobilize the right leg and has been refusing to bear weight.  No clinical evidence of neck trauma.  Ranging neck fully and spontaneously.           X-rays and imaging ordered through triage were all reviewed and appropriate.  X-ray of the ankle shows lateral malleolar fracture, which is concordant with the described mechanism and is not consistent with a non accidental trauma.  Examination of all other joints in the leg both radiographically and in-person is not consistent with occult fracture there.  No compartment syndrome.  No open fracture.  Risks benefits and alternatives discussed, proceeded with splinting.  Would not be a good candidate for walking boot at due to chronic lateral deviation of the foot.  See procedure note.  Rest ice compress and elevate therapy all discussed.    Patient seen in the Emergency department, which included consideration and evaluation for life and limb threatening medical conditions including the history, exam, timely review and interpretation of studies.   All labs and imaging ordered for this encountered were reviewed and  included in medical decision making.   Additional information for this case was obtained from discussion with:  Previous inpatient medical records, discharge summary from previous admission  History obtained from patient's mother present at bedside and grandmother present at bedside    Procedure, right leg posterior short-leg splint, personally performed by me.  Verbal consent obtained from parents/mother to proceed.  Right-sided Posterior short-leg Splint was placed using 4 in Orthoglass.  Vascularly intact after procedure.                          Clinical Impression:  Final diagnoses:  [W19.XXXA] Fall  [S82.61XA] Closed fracture of distal lateral malleolus of right fibula, initial encounter (Primary)          ED Disposition Condition    Discharge Stable          ED Prescriptions    None       Follow-up Information       Follow up With Specialties Details Why Contact Info    Bonifacio Walker, DPM Podiatry, Wound Care   [address] [facility]  [address] [place] [id number] [phone]                 [1]   Social History  Tobacco Use    Smoking status: Never    Smokeless tobacco: Never   Substance Use Topics    Alcohol use: No    Drug use: No        Otto Louie MD  03/17/25 [id number]

## 2025-03-18 NOTE — TELEPHONE ENCOUNTER
Spoke with pt's mother, appointment scheduled. Acceptance verbalized  ----- Message from [employer] sent at 3/18/2025 11:07 AM CDT -----    Patient Returning CallWho Called:pt momDoes the patient know what this is regarding?:need an appt within 3 days please call momWould the patient rather a call back or a response via [facility]? [facility] Call Back Number:[phone] Information: call back

## 2025-03-19 ENCOUNTER — TELEPHONE (OUTPATIENT)
Dept: ORTHOPEDICS | Facility: CLINIC | Age: 25
End: 2025-03-19
Payer: MEDICAID

## 2025-03-20 ENCOUNTER — OFFICE VISIT (OUTPATIENT)
Dept: ORTHOPEDICS | Facility: CLINIC | Age: 25
End: 2025-03-20
Payer: MEDICAID

## 2025-03-20 DIAGNOSIS — S82.61XA CLOSED FRACTURE OF DISTAL LATERAL MALLEOLUS OF RIGHT FIBULA, INITIAL ENCOUNTER: ICD-10-CM

## 2025-03-20 PROCEDURE — 99213 OFFICE O/P EST LOW 20 MIN: CPT | Mod: PBBFAC | Performed by: STUDENT IN AN ORGANIZED HEALTH CARE EDUCATION/TRAINING PROGRAM

## 2025-03-20 PROCEDURE — 1160F RVW MEDS BY RX/DR IN RCRD: CPT | Mod: CPTII,,, | Performed by: STUDENT IN AN ORGANIZED HEALTH CARE EDUCATION/TRAINING PROGRAM

## 2025-03-20 PROCEDURE — 99999 PR PBB SHADOW E&M-EST. PATIENT-LVL III: CPT | Mod: PBBFAC,,, | Performed by: STUDENT IN AN ORGANIZED HEALTH CARE EDUCATION/TRAINING PROGRAM

## 2025-03-20 PROCEDURE — 1159F MED LIST DOCD IN RCRD: CPT | Mod: CPTII,,, | Performed by: STUDENT IN AN ORGANIZED HEALTH CARE EDUCATION/TRAINING PROGRAM

## 2025-03-20 PROCEDURE — 99203 OFFICE O/P NEW LOW 30 MIN: CPT | Mod: S$PBB,,, | Performed by: STUDENT IN AN ORGANIZED HEALTH CARE EDUCATION/TRAINING PROGRAM

## 2025-04-04 DIAGNOSIS — S82.61XA CLOSED FRACTURE OF DISTAL LATERAL MALLEOLUS OF RIGHT FIBULA, INITIAL ENCOUNTER: Primary | ICD-10-CM

## 2025-04-08 ENCOUNTER — TELEPHONE (OUTPATIENT)
Dept: ORTHOPEDICS | Facility: CLINIC | Age: 25
End: 2025-04-08
Payer: MEDICAID

## 2025-04-09 ENCOUNTER — OFFICE VISIT (OUTPATIENT)
Dept: ORTHOPEDICS | Facility: CLINIC | Age: 25
End: 2025-04-09
Payer: MEDICAID

## 2025-04-09 ENCOUNTER — HOSPITAL ENCOUNTER (OUTPATIENT)
Dept: RADIOLOGY | Facility: HOSPITAL | Age: 25
Discharge: HOME | End: 2025-04-09
Attending: STUDENT IN AN ORGANIZED HEALTH CARE EDUCATION/TRAINING PROGRAM
Payer: MEDICAID

## 2025-04-09 DIAGNOSIS — S82.61XA CLOSED FRACTURE OF DISTAL LATERAL MALLEOLUS OF RIGHT FIBULA, INITIAL ENCOUNTER: ICD-10-CM

## 2025-04-09 DIAGNOSIS — S82.61XA CLOSED FRACTURE OF DISTAL LATERAL MALLEOLUS OF RIGHT FIBULA, INITIAL ENCOUNTER: Primary | ICD-10-CM

## 2025-04-09 PROCEDURE — 73610 X-RAY EXAM OF ANKLE: CPT | Mod: TC,RT

## 2025-04-09 PROCEDURE — 99212 OFFICE O/P EST SF 10 MIN: CPT | Mod: PBBFAC,25 | Performed by: STUDENT IN AN ORGANIZED HEALTH CARE EDUCATION/TRAINING PROGRAM

## 2025-04-09 PROCEDURE — 73610 X-RAY EXAM OF ANKLE: CPT | Mod: 26,RT,, | Performed by: RADIOLOGY

## 2025-04-09 PROCEDURE — 99212 OFFICE O/P EST SF 10 MIN: CPT | Mod: S$PBB,,, | Performed by: STUDENT IN AN ORGANIZED HEALTH CARE EDUCATION/TRAINING PROGRAM

## 2025-04-09 PROCEDURE — 99999 PR PBB SHADOW E&M-EST. PATIENT-LVL II: CPT | Mod: PBBFAC,,, | Performed by: STUDENT IN AN ORGANIZED HEALTH CARE EDUCATION/TRAINING PROGRAM

## 2025-04-09 PROCEDURE — 4010F ACE/ARB THERAPY RXD/TAKEN: CPT | Mod: CPTII,,, | Performed by: STUDENT IN AN ORGANIZED HEALTH CARE EDUCATION/TRAINING PROGRAM

## 2025-04-09 PROCEDURE — 1159F MED LIST DOCD IN RCRD: CPT | Mod: CPTII,,, | Performed by: STUDENT IN AN ORGANIZED HEALTH CARE EDUCATION/TRAINING PROGRAM

## 2025-04-09 PROCEDURE — 1160F RVW MEDS BY RX/DR IN RCRD: CPT | Mod: CPTII,,, | Performed by: STUDENT IN AN ORGANIZED HEALTH CARE EDUCATION/TRAINING PROGRAM

## 2025-04-09 NOTE — PROGRESS NOTES
Orthopedic Trauma Surgery Progress Note    Patient Problem(s):  Right distal fibula avulsion fracture  Date of Injury:  03/17/2025.  Mechanism of Injury:  Fall getting out about the bathtub     Interval Present History: Daniela is doing well.  They tried the lace-up ankle brace.  She has been weight-bearing in this.  Overall it seems that her pain is improving.    Imaging:  No new today.    PE:  No tenderness over the lateral malleolus.    Assessment and Plan: Daniela is doing well.  Clinically she is healing well.  Although there was a distal fibula fracture, this is essentially an ankle sprain.  She is fully weight-bearing with minimal pain.  I told her mother that I am happy to see her again if there are any concerns however I do not think that we need to keep bring her back here.  I do not need to get any other x-rays unless there is an issue.